# Patient Record
Sex: FEMALE | Race: WHITE | NOT HISPANIC OR LATINO | Employment: FULL TIME | URBAN - METROPOLITAN AREA
[De-identification: names, ages, dates, MRNs, and addresses within clinical notes are randomized per-mention and may not be internally consistent; named-entity substitution may affect disease eponyms.]

---

## 2024-02-06 ENCOUNTER — OFFICE VISIT (OUTPATIENT)
Dept: PRIMARY CARE | Facility: CLINIC | Age: 39
End: 2024-02-06
Payer: COMMERCIAL

## 2024-02-06 VITALS
TEMPERATURE: 98.2 F | DIASTOLIC BLOOD PRESSURE: 76 MMHG | BODY MASS INDEX: 32.12 KG/M2 | SYSTOLIC BLOOD PRESSURE: 118 MMHG | WEIGHT: 202 LBS | HEART RATE: 74 BPM

## 2024-02-06 DIAGNOSIS — M79.672 LEFT FOOT PAIN: Primary | ICD-10-CM

## 2024-02-06 PROBLEM — B37.31 RECURRENT CANDIDIASIS OF VAGINA: Status: RESOLVED | Noted: 2023-01-27 | Resolved: 2024-02-06

## 2024-02-06 PROBLEM — K64.4 EXTERNAL HEMORRHOIDS: Status: RESOLVED | Noted: 2020-01-15 | Resolved: 2024-02-06

## 2024-02-06 PROBLEM — S02.5XXA FRACTURE OF TOOTH: Status: RESOLVED | Noted: 2020-10-01 | Resolved: 2024-02-06

## 2024-02-06 PROBLEM — L98.9 SKIN LESION: Status: ACTIVE | Noted: 2023-01-27

## 2024-02-06 PROBLEM — S06.0X0A CONCUSSION WITH NO LOSS OF CONSCIOUSNESS: Status: RESOLVED | Noted: 2020-10-01 | Resolved: 2024-02-06

## 2024-02-06 PROBLEM — S09.90XA INJURY OF HEAD: Status: RESOLVED | Noted: 2024-02-06 | Resolved: 2024-02-06

## 2024-02-06 PROBLEM — R51.9 HEADACHE: Status: RESOLVED | Noted: 2020-07-09 | Resolved: 2024-02-06

## 2024-02-06 PROBLEM — K64.1 PROLAPSED INTERNAL HEMORRHOIDS, GRADE 2: Status: RESOLVED | Noted: 2024-02-06 | Resolved: 2024-02-06

## 2024-02-06 PROBLEM — E66.9 OBESITY: Status: ACTIVE | Noted: 2021-10-06

## 2024-02-06 PROBLEM — M50.90 CERVICAL DISC DISEASE: Status: ACTIVE | Noted: 2020-10-13

## 2024-02-06 PROCEDURE — 99212 OFFICE O/P EST SF 10 MIN: CPT | Performed by: PHYSICIAN ASSISTANT

## 2024-02-06 RX ORDER — LEVONORGESTREL 52 MG/1
INTRAUTERINE DEVICE INTRAUTERINE
COMMUNITY
Start: 2023-04-05

## 2024-02-06 ASSESSMENT — ENCOUNTER SYMPTOMS
ARTHRALGIAS: 1
JOINT SWELLING: 0
TINGLING: 0
INABILITY TO BEAR WEIGHT: 0
MUSCLE WEAKNESS: 0
NUMBNESS: 0
MYALGIAS: 1

## 2024-02-06 ASSESSMENT — PAIN SCALES - GENERAL: PAINLEVEL: 6

## 2024-02-06 NOTE — PROGRESS NOTES
Subjective   Patient ID: Dominique Macedo is a 38 y.o. female who presents for Foot Swelling (Pain and redness since Saturday night after roller skating in left great toe. She had surgery on it in 2001 and she is concerned about possible hardware failure or infection as it is in same location. ).    Foot Injury   The incident occurred 3 to 5 days ago. Incident location: was roller skating and felt pain when she took the skate off. The pain is present in the left foot (has bunion repair at age 16). The pain is mild. The pain has been Fluctuating since onset. Pertinent negatives include no inability to bear weight, muscle weakness, numbness or tingling. She reports no foreign bodies present. The symptoms are aggravated by movement and weight bearing.        Review of Systems   Musculoskeletal:  Positive for arthralgias, gait problem and myalgias. Negative for joint swelling.   Neurological:  Negative for tingling and numbness.       Objective   /76   Pulse 74   Temp 36.8 °C (98.2 °F)   Wt 91.6 kg (202 lb)   BMI 32.12 kg/m²     Physical Exam  Musculoskeletal:      Left foot: Normal range of motion. Swelling, deformity, tenderness and bony tenderness present. No bunion or laceration.      Comments: She had a palpable hard region on the medial aspect of her left great toe, where her previous surgery had occurred.    Neurological:      Mental Status: She is alert.         Assessment/Plan   Diagnoses and all orders for this visit:  Left foot pain  -     XR foot left 3+ views; Future  Will check with xray to see if the pin she had placed years ago has moved or broken.

## 2024-02-07 ENCOUNTER — TELEPHONE (OUTPATIENT)
Dept: PRIMARY CARE | Facility: CLINIC | Age: 39
End: 2024-02-07
Payer: COMMERCIAL

## 2024-02-07 ENCOUNTER — HOSPITAL ENCOUNTER (OUTPATIENT)
Dept: RADIOLOGY | Facility: CLINIC | Age: 39
Discharge: HOME | End: 2024-02-07
Payer: COMMERCIAL

## 2024-02-07 DIAGNOSIS — M79.672 LEFT FOOT PAIN: ICD-10-CM

## 2024-02-07 PROCEDURE — 73630 X-RAY EXAM OF FOOT: CPT | Mod: LEFT SIDE | Performed by: RADIOLOGY

## 2024-02-07 PROCEDURE — 73630 X-RAY EXAM OF FOOT: CPT | Mod: LT

## 2024-02-07 NOTE — TELEPHONE ENCOUNTER
Pt called [phone] she had her XRAY on her foot today. The pain has increased as of  this am and is tingly

## 2025-02-27 ENCOUNTER — HOSPITAL ENCOUNTER (OUTPATIENT)
Dept: RADIOLOGY | Facility: HOSPITAL | Age: 40
Discharge: HOME | End: 2025-02-27
Payer: COMMERCIAL

## 2025-02-27 VITALS — WEIGHT: 210 LBS | BODY MASS INDEX: 33.75 KG/M2 | HEIGHT: 66 IN

## 2025-02-27 DIAGNOSIS — Z12.31 SCREENING MAMMOGRAM FOR BREAST CANCER: ICD-10-CM

## 2025-02-27 PROCEDURE — 77067 SCR MAMMO BI INCL CAD: CPT

## 2025-02-27 PROCEDURE — 77067 SCR MAMMO BI INCL CAD: CPT | Performed by: RADIOLOGY

## 2025-02-27 PROCEDURE — 77063 BREAST TOMOSYNTHESIS BI: CPT | Performed by: RADIOLOGY

## 2025-03-06 ENCOUNTER — TELEPHONE (OUTPATIENT)
Dept: PRIMARY CARE | Facility: CLINIC | Age: 40
End: 2025-03-06
Payer: COMMERCIAL

## 2025-03-06 DIAGNOSIS — R92.8 ABNORMAL MAMMOGRAM OF BOTH BREASTS: Primary | ICD-10-CM

## 2025-03-11 ENCOUNTER — HOSPITAL ENCOUNTER (OUTPATIENT)
Dept: RADIOLOGY | Facility: CLINIC | Age: 40
Discharge: HOME | End: 2025-03-11
Payer: COMMERCIAL

## 2025-03-11 DIAGNOSIS — R92.8 ABNORMAL MAMMOGRAM OF BOTH BREASTS: ICD-10-CM

## 2025-03-11 PROCEDURE — 77061 BREAST TOMOSYNTHESIS UNI: CPT | Mod: LEFT SIDE | Performed by: RADIOLOGY

## 2025-03-11 PROCEDURE — 76981 USE PARENCHYMA: CPT | Mod: RT

## 2025-03-11 PROCEDURE — 76642 ULTRASOUND BREAST LIMITED: CPT | Mod: LEFT SIDE | Performed by: RADIOLOGY

## 2025-03-11 PROCEDURE — 76642 ULTRASOUND BREAST LIMITED: CPT | Mod: 50

## 2025-03-11 PROCEDURE — 77061 BREAST TOMOSYNTHESIS UNI: CPT | Mod: LT

## 2025-03-11 PROCEDURE — 77065 DX MAMMO INCL CAD UNI: CPT | Mod: LEFT SIDE | Performed by: RADIOLOGY

## 2025-03-18 ENCOUNTER — APPOINTMENT (OUTPATIENT)
Dept: PRIMARY CARE | Facility: CLINIC | Age: 40
End: 2025-03-18
Payer: COMMERCIAL

## 2025-04-11 DIAGNOSIS — Z00.00 ROUTINE GENERAL MEDICAL EXAMINATION AT A HEALTH CARE FACILITY: ICD-10-CM

## 2025-04-11 ASSESSMENT — PROMIS GLOBAL HEALTH SCALE
RATE_GENERAL_HEALTH: VERY GOOD
CARRYOUT_PHYSICAL_ACTIVITIES: COMPLETELY
RATE_AVERAGE_FATIGUE: MILD
EMOTIONAL_PROBLEMS: RARELY
CARRYOUT_SOCIAL_ACTIVITIES: VERY GOOD
RATE_AVERAGE_PAIN: 5
RATE_SOCIAL_SATISFACTION: VERY GOOD
RATE_MENTAL_HEALTH: VERY GOOD
RATE_QUALITY_OF_LIFE: VERY GOOD
RATE_PHYSICAL_HEALTH: GOOD

## 2025-04-14 ENCOUNTER — APPOINTMENT (OUTPATIENT)
Dept: PRIMARY CARE | Facility: CLINIC | Age: 40
End: 2025-04-14
Payer: COMMERCIAL

## 2025-04-14 VITALS
HEIGHT: 66 IN | SYSTOLIC BLOOD PRESSURE: 112 MMHG | BODY MASS INDEX: 33.11 KG/M2 | WEIGHT: 206 LBS | HEART RATE: 61 BPM | DIASTOLIC BLOOD PRESSURE: 74 MMHG | OXYGEN SATURATION: 100 %

## 2025-04-14 DIAGNOSIS — Z00.00 ROUTINE GENERAL MEDICAL EXAMINATION AT A HEALTH CARE FACILITY: ICD-10-CM

## 2025-04-14 DIAGNOSIS — M79.672 BILATERAL FOOT PAIN: ICD-10-CM

## 2025-04-14 DIAGNOSIS — M79.671 BILATERAL FOOT PAIN: ICD-10-CM

## 2025-04-14 DIAGNOSIS — E66.811 CLASS 1 OBESITY WITHOUT SERIOUS COMORBIDITY WITH BODY MASS INDEX (BMI) OF 33.0 TO 33.9 IN ADULT, UNSPECIFIED OBESITY TYPE: Primary | ICD-10-CM

## 2025-04-14 PROCEDURE — 3008F BODY MASS INDEX DOCD: CPT | Performed by: PHYSICIAN ASSISTANT

## 2025-04-14 PROCEDURE — 99396 PREV VISIT EST AGE 40-64: CPT | Performed by: PHYSICIAN ASSISTANT

## 2025-04-14 ASSESSMENT — PAIN SCALES - GENERAL: PAINLEVEL_OUTOF10: 0-NO PAIN

## 2025-04-14 ASSESSMENT — ENCOUNTER SYMPTOMS
SHORTNESS OF BREATH: 0
FREQUENCY: 0
APPETITE CHANGE: 0
SLEEP DISTURBANCE: 0
NAUSEA: 0
BLOOD IN STOOL: 0
DIARRHEA: 0
LIGHT-HEADEDNESS: 0
DIZZINESS: 0
NERVOUS/ANXIOUS: 0
ARTHRALGIAS: 0
CONSTIPATION: 0
MYALGIAS: 0
PALPITATIONS: 0
CHEST TIGHTNESS: 0
COLOR CHANGE: 0
ACTIVITY CHANGE: 0
ABDOMINAL PAIN: 0
WHEEZING: 0
TREMORS: 0

## 2025-04-14 ASSESSMENT — PATIENT HEALTH QUESTIONNAIRE - PHQ9
2. FEELING DOWN, DEPRESSED OR HOPELESS: NOT AT ALL
1. LITTLE INTEREST OR PLEASURE IN DOING THINGS: NOT AT ALL
SUM OF ALL RESPONSES TO PHQ9 QUESTIONS 1 AND 2: 0

## 2025-04-14 NOTE — PROGRESS NOTES
"Subjective   Patient ID: Dominique Macedo is a 40 y.o. female who presents for Annual Exam (Last pap 2023 normal and negative).    Migraine   Pertinent negatives include no abdominal pain, dizziness or nausea.      Having a tooth pulled next week. Has an old root canal that has been causing her trouble.  Has IUD. Doing well.  Review of Systems   Constitutional:  Positive for fatigue. Negative for activity change and appetite change.   HENT:  Positive for dental problem. Negative for mouth sores and nosebleeds.    Eyes:  Negative for visual disturbance.   Respiratory:  Negative for chest tightness, shortness of breath and wheezing.    Cardiovascular:  Negative for chest pain, palpitations and leg swelling.   Gastrointestinal:  Negative for abdominal pain, blood in stool, constipation, diarrhea and nausea.   Endocrine: Negative for cold intolerance and heat intolerance.   Genitourinary:  Negative for frequency and urgency.   Musculoskeletal:  Negative for arthralgias and myalgias.   Skin:  Negative for color change.   Allergic/Immunologic: Negative for immunocompromised state.   Neurological:  Negative for dizziness, tremors and light-headedness.   Psychiatric/Behavioral:  Negative for behavioral problems and sleep disturbance. The patient is not nervous/anxious.        Objective   /74   Pulse 61   Ht 1.676 m (5' 6\")   Wt 93.4 kg (206 lb)   LMP  (LMP Unknown)   SpO2 100%   BMI 33.25 kg/m²     Physical Exam  Constitutional:       Appearance: She is obese.   HENT:      Right Ear: Tympanic membrane normal.      Left Ear: Tympanic membrane normal.      Nose: Nose normal.      Mouth/Throat:      Mouth: Mucous membranes are moist.   Eyes:      Extraocular Movements: Extraocular movements intact.      Pupils: Pupils are equal, round, and reactive to light.   Neck:      Thyroid: No thyromegaly.   Cardiovascular:      Rate and Rhythm: Normal rate and regular rhythm.      Pulses: Normal pulses.   Pulmonary:      " Effort: Pulmonary effort is normal. No respiratory distress.   Abdominal:      General: Bowel sounds are normal.      Tenderness: There is no abdominal tenderness.   Musculoskeletal:      Cervical back: Normal range of motion. No tenderness.      Right lower leg: No edema.      Left lower leg: No edema.   Skin:     General: Skin is warm.   Neurological:      General: No focal deficit present.      Mental Status: She is alert. Mental status is at baseline.   Psychiatric:         Mood and Affect: Mood normal.         Thought Content: Thought content normal.         Judgment: Judgment normal.         Assessment/Plan   Diagnoses and all orders for this visit:  Class 1 obesity without serious comorbidity with body mass index (BMI) of 33.0 to 33.9 in adult, unspecified obesity type  -     CBC and Auto Differential; Future  -     Comprehensive Metabolic Panel; Future  -     Lipid Panel; Future  -     TSH with reflex to Free T4 if abnormal; Future  Routine general medical examination at a health care facility  -     CBC and Auto Differential; Future  -     Comprehensive Metabolic Panel; Future  -     Lipid Panel; Future  -     TSH with reflex to Free T4 if abnormal; Future  Bilateral foot pain  Having more trouble lately. Discussed wearing good shoes and foot massages.

## 2025-04-16 ASSESSMENT — ENCOUNTER SYMPTOMS: FATIGUE: 1
